# Patient Record
Sex: MALE | Race: WHITE | NOT HISPANIC OR LATINO | URBAN - METROPOLITAN AREA
[De-identification: names, ages, dates, MRNs, and addresses within clinical notes are randomized per-mention and may not be internally consistent; named-entity substitution may affect disease eponyms.]

---

## 2017-01-05 ENCOUNTER — COMMUNICATION - HEALTHEAST (OUTPATIENT)
Dept: NEUROLOGY | Facility: CLINIC | Age: 52
End: 2017-01-05

## 2017-01-05 DIAGNOSIS — F39 UNSPECIFIED EPISODIC MOOD DISORDER: ICD-10-CM

## 2017-04-20 ENCOUNTER — HOSPITAL ENCOUNTER (OUTPATIENT)
Dept: NEUROLOGY | Facility: CLINIC | Age: 52
Setting detail: THERAPIES SERIES
Discharge: STILL ADMITTED OR ADMINISTRATIVE | End: 2017-04-20
Attending: NURSE PRACTITIONER

## 2017-04-20 DIAGNOSIS — F09 COGNITIVE DISORDER: ICD-10-CM

## 2017-04-20 DIAGNOSIS — F39 EPISODIC MOOD DISORDER (H): ICD-10-CM

## 2017-04-20 DIAGNOSIS — S06.9XAA TBI (TRAUMATIC BRAIN INJURY) (H): ICD-10-CM

## 2017-04-20 DIAGNOSIS — G47.9 SLEEP DISTURBANCE: ICD-10-CM

## 2017-05-25 ENCOUNTER — COMMUNICATION - HEALTHEAST (OUTPATIENT)
Dept: NEUROLOGY | Facility: CLINIC | Age: 52
End: 2017-05-25

## 2017-05-25 DIAGNOSIS — F39 UNSPECIFIED EPISODIC MOOD DISORDER: ICD-10-CM

## 2017-07-20 ENCOUNTER — HOSPITAL ENCOUNTER (OUTPATIENT)
Dept: NEUROLOGY | Facility: CLINIC | Age: 52
Setting detail: THERAPIES SERIES
Discharge: STILL ADMITTED OR ADMINISTRATIVE | End: 2017-07-20
Attending: NURSE PRACTITIONER

## 2017-07-20 DIAGNOSIS — F09 COGNITIVE DISORDER: ICD-10-CM

## 2017-07-20 DIAGNOSIS — F39 EPISODIC MOOD DISORDER (H): ICD-10-CM

## 2017-07-20 DIAGNOSIS — F39 UNSPECIFIED EPISODIC MOOD DISORDER: ICD-10-CM

## 2017-07-20 DIAGNOSIS — G47.9 SLEEP DISTURBANCE: ICD-10-CM

## 2017-07-20 RX ORDER — QUETIAPINE FUMARATE 100 MG/1
100 TABLET, FILM COATED ORAL AT BEDTIME
Qty: 30 TABLET | Refills: 11 | Status: SHIPPED | OUTPATIENT
Start: 2017-07-20

## 2021-05-30 ENCOUNTER — RECORDS - HEALTHEAST (OUTPATIENT)
Dept: ADMINISTRATIVE | Facility: CLINIC | Age: 56
End: 2021-05-30

## 2021-05-30 VITALS — WEIGHT: 220 LBS

## 2021-06-10 NOTE — PROGRESS NOTES
".  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  Insomnia; improved.  Follow-up medication evaluation.  Patient's impression of how medication is working? good  Compliant with medication? yes    Side effects:None       Plan:     Recommended no marijuana use, discussed the risks of combining it with Seroquel.  Discussed importance of weight loss, patient is changing his diet and plans to get more physical activity.  Discussed the importance of yearly monitoring of blood pressure, cholesterol and blood sugar while on Seroquel.  [facility]  We will have patient follow-up in 3 months.    Subjective:      HPI: Zaki Guerrero is a 52 y.o. male with cognitive and mood disorder secondary to TBI.  He is here for follow-up medication evaluation along with his mother.  Patient states his moods been stable although his mother reports that he does have anxiety at times during the day.  He is seeing his counselor twice a month, finds that is very helpful.  He now has an emids worker he is in the process of getting a court appointed domestic violence assessment.  [facility].  His [facility] the [facility] to [facility]oid rechecked.  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  He continues to enjoy cooking.  He does report nightly use of marijuana along with the Seroquel.  He feels it to relax and sleep better.  He only takes \"1 or 2 hits\".  He does not smoke during the day.    Zaki  has a past medical history of Alcoholism in remission; Head injury; and Sarcoidosis.  Zaki  has no past surgical history on file.  Penicillins  Current Outpatient Prescriptions   Medication Sig Dispense Refill     QUEtiapine (SEROQUEL) 100 MG tablet Take 1 tablet (100 mg total) by mouth bedtime. 30 tablet 3     No current facility-administered medications for this encounter.      No family " history on file.  Social History     Social History     Marital status: Single     Spouse name: N/A     Number of children: N/A     Years of education: N/A     Social History Main Topics     Smoking status: Never Smoker     Smokeless tobacco: Current User     Alcohol use No     Drug use: Yes     Special: Marijuana     Sexual activity: No     Other Topics Concern     Not on file     Social History Narrative     No narrative on file       The following portions of the patient's history were reviewed and updated as appropriate: allergies, current medications, past family history, past medical history, past social history, past surgical history and problem list.    Review of Systems  Constitutional: negative  Gastrointestinal: negative for dysphagia  Neurological: no involuntary movements  Behavioral/Psych: positive for anxiety, fatigue and illegal drug usage, negative for depression, loss of interest in favorite activities, mood swings, sleep disturbance and hallucinations       Objective:   @VS    Mental Status Exam:     Appearance: Patient is casually dressed, pleasant and cooperative.  Good eye contact.  He arrives to the appointment on time accompanied by his mother.    Speech / Language : Within normal    Associations: appropriate    Alert and oriented X 3:es    Mood: Euthymic  Affect: Congruent w/content of speech    Suicidal / Homicidal ideation:none  If yes, document safety plan:    Fund of knowledge: good    Thought process:Within normal    Judgement / insight: Impairment    Recent and remote memory: Baseline impaired.    Attention: Within normal  Concentration: Within normal    Motor status: Gait normal, no involuntary movements.    Scores: NA    Change in medication? No    Education    Reviewed risks/benefits of medication      Physical Exam: Wt 220 lb (99.8 kg)  General appearance: alert, appears stated age, cooperative and no distress  Neurologic: Mental status: Alert, oriented, thought content  appropriate, affect: mood-congruent, thought content exhibits logical connections, when questioned about suicide, the patient expresses no suicidal ideation, no homicidal ideation

## 2021-06-10 NOTE — PROGRESS NOTES
Patient's impression of how medication is working? Pt said med is good, but mom said pt get anxiety attacs doing the day.    Compliant with Medication? yes     Side Effects: None    Current Symptoms : No    Pain (0-10) No  Appetite change Yes and for the better, but too good of an appetitte. pt said his going on a diet  Negative thoughts No  Alcohol use No  Drug use Yes  Mood swings No  Sleep disturbance No  Change in interest No  Change in energy No  Change in concentration No  Psychosis/Hallucinations No  Anxiety mild  Sad/depressed mood none

## 2021-06-12 NOTE — PROGRESS NOTES
Patient's impression of how medication is working? Meds are working good.    Compliant with Medication? none    Side Effects: None    Current Symptoms : No    Pain (0-10) No  Appetite change No  Negative thoughts No  Alcohol use No  Drug use No  Mood swings No  Sleep disturbance No  Change in interest No  Change in energy No  Change in concentration No  Psychosis/Hallucinations No  Anxiety none  Sad/depressed mood none

## 2021-06-12 NOTE — PROGRESS NOTES
".  Assessment / Impression     Cognitive and mood disorder secondary to TBI; stable.  Insomnia; improved.  Follow-up medication evaluation.  Patient's impression of how medication is working? good  Compliant with medication? yes    Side effects:None       Plan:     AIMS Testing completed today; Score-0.  [facility]  Instructed patient that he should be getting his lipid panel, blood sugar tested as he is on Seroquel.  We will have patient follow-up in 3 months.    Subjective:      HPI: Zaki Guerrero is a 52 y.o. male with cognitive and mood disorder secondary to TBI.  He is here for follow-up medication evaluation.  Zaki states the medication is working well.  He notes that if he forgets the Seroquel he will have trouble falling asleep.  If he takes it too late, he does have difficulty getting up in the morning.  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  He is biking more and trying to get more regular physical activity to lose weight.  [facility].  Patient feels he has good support.  He continues to live with his mother has a \"very good [employer] worker\".  He also sees his therapist once a month.  Zaki denies any negative, suicidal or homicidal thoughts.  He still expresses frustration with his ex-wife when he is unable to make his scheduled supervised visits with his daughters.  New medical issues.  He denies any trouble swallowing or involuntary movements.    Zaki  has a past medical history of Alcoholism in remission; Head injury; and Sarcoidosis.  Zaki  has no past surgical history on file.  Penicillins  Current Outpatient Prescriptions   Medication Sig Dispense Refill     QUEtiapine (SEROQUEL) 100 MG tablet Take 1 tablet (100 mg total) by mouth at bedtime. 30 tablet 3     No current facility-administered medications for this encounter.      No family history on file.  Social History     Social " History     Marital status: Single     Spouse name: N/A     Number of children: N/A     Years of education: N/A     Social History Main Topics     Smoking status: Never Smoker     Smokeless tobacco: Current User     Alcohol use No     Drug use: Yes     Special: Marijuana     Sexual activity: No     Other Topics Concern     Not on file     Social History Narrative     No narrative on file       The following portions of the patient's history were reviewed and updated as appropriate: allergies, current medications, past family history, past medical history, past social history, past surgical history and problem list.    Review of Systems  Constitutional: negative  Musculoskeletal:negative for back pain  Neurological: no involuntary movements  Behavioral/Psych: negative for anxiety, behavior problems, depression, fatigue, irritability, loss of interest in favorite activities and mood swings       Objective:   @VS    Mental Status Exam:     Appearance: Patient is casually dressed, pleasant and cooperative.  Good eye contact.  No psychomotor agitation.    Speech / Language : Within normal and Excessive    Associations: appropriate    Alert and oriented X 3:yes    Mood: Euthymic  Affect: Congruent w/content of speech    Suicidal / Homicidal ideation:none  If yes, document safety plan:    Fund of knowledge: good    Thought process:Within normal    Judgement / insight: No Evidence of Impairment    Recent and remote memory: WNL    Attention: Within normal  Concentration: Within normal    Motor status: Gait normal, no involuntary movements.    Scores: AIMS 0    Change in medication? No    Education    Reviewed risks/benefits of medication and including metabolic syndrome and TD      Physical Exam: General appearance: alert, appears stated age, cooperative and no distress  Neurologic: Mental status: Alert, oriented, thought content appropriate, affect: mood-congruent, thought content exhibits logical connections, when  questioned about suicide, the patient expresses no suicidal ideation, no homicidal ideation

## 2021-07-03 NOTE — ADDENDUM NOTE
Addendum Note by Sol Gomes MA at 4/20/2017  1:27 PM     Author: Sol Gomes MA Service: -- Author Type: Medical Assistant    Filed: 4/20/2017  1:27 PM Date of Service: 4/20/2017  1:27 PM Status: Signed    : Sol Gomes MA (Medical Assistant)    Encounter addended by: Sol Gomes MA on: 4/20/2017  1:27 PM<BR>     Actions taken: Charge Capture section accepted

## 2021-07-03 NOTE — ADDENDUM NOTE
Addendum Note by Frida Bobby RN at 7/20/2017 11:59 PM     Author: Frida Bobby RN Service: -- Author Type: Registered Nurse    Filed: 7/24/2017 11:12 AM Date of Service: 7/20/2017 11:59 PM Status: Signed    : Frida Bobby RN (Registered Nurse)    Encounter addended by: Frida Bobby RN on: 7/24/2017 11:12 AM<BR>     Actions taken: Charge Capture section accepted